# Patient Record
Sex: FEMALE | Race: OTHER
[De-identification: names, ages, dates, MRNs, and addresses within clinical notes are randomized per-mention and may not be internally consistent; named-entity substitution may affect disease eponyms.]

---

## 2019-10-16 ENCOUNTER — HOSPITAL ENCOUNTER (OUTPATIENT)
Dept: HOSPITAL 53 - M SFHCLERA | Age: 29
End: 2019-10-16
Attending: NURSE PRACTITIONER
Payer: COMMERCIAL

## 2019-10-16 ENCOUNTER — HOSPITAL ENCOUNTER (OUTPATIENT)
Dept: HOSPITAL 53 - M LRY | Age: 29
End: 2019-10-16
Attending: NURSE PRACTITIONER
Payer: COMMERCIAL

## 2019-10-16 DIAGNOSIS — N89.8: Primary | ICD-10-CM

## 2019-10-16 DIAGNOSIS — R06.09: Primary | ICD-10-CM

## 2019-10-16 LAB
CHLAMYDIA DNA AMPLIFICATION: NEGATIVE
N GONORRHOEA RRNA SPEC QL NAA+PROBE: NEGATIVE

## 2019-11-01 ENCOUNTER — HOSPITAL ENCOUNTER (OUTPATIENT)
Dept: HOSPITAL 53 - M SFHCLERA | Age: 29
End: 2019-11-01
Attending: PHYSICIAN ASSISTANT
Payer: COMMERCIAL

## 2019-11-01 DIAGNOSIS — N89.8: Primary | ICD-10-CM

## 2019-11-01 LAB
CHLAMYDIA DNA AMPLIFICATION: NEGATIVE
N GONORRHOEA RRNA SPEC QL NAA+PROBE: NEGATIVE

## 2020-03-05 ENCOUNTER — HOSPITAL ENCOUNTER (OUTPATIENT)
Dept: HOSPITAL 53 - M SFHCWAGY | Age: 30
End: 2020-03-05
Attending: ADVANCED PRACTICE MIDWIFE
Payer: COMMERCIAL

## 2020-03-05 DIAGNOSIS — Z11.3: Primary | ICD-10-CM

## 2020-03-05 DIAGNOSIS — Z12.4: ICD-10-CM

## 2020-03-05 LAB
CHLAMYDIA DNA AMPLIFICATION: NEGATIVE
N GONORRHOEA RRNA SPEC QL NAA+PROBE: NEGATIVE

## 2021-03-02 ENCOUNTER — HOSPITAL ENCOUNTER (OUTPATIENT)
Dept: HOSPITAL 53 - M SDC | Age: 31
LOS: 1 days | Discharge: HOME | End: 2021-03-03
Attending: PLASTIC SURGERY
Payer: COMMERCIAL

## 2021-03-02 VITALS — DIASTOLIC BLOOD PRESSURE: 70 MMHG | SYSTOLIC BLOOD PRESSURE: 111 MMHG

## 2021-03-02 VITALS — DIASTOLIC BLOOD PRESSURE: 67 MMHG | SYSTOLIC BLOOD PRESSURE: 114 MMHG

## 2021-03-02 VITALS — DIASTOLIC BLOOD PRESSURE: 68 MMHG | SYSTOLIC BLOOD PRESSURE: 117 MMHG

## 2021-03-02 VITALS — DIASTOLIC BLOOD PRESSURE: 72 MMHG | SYSTOLIC BLOOD PRESSURE: 124 MMHG

## 2021-03-02 VITALS — SYSTOLIC BLOOD PRESSURE: 120 MMHG | DIASTOLIC BLOOD PRESSURE: 62 MMHG

## 2021-03-02 VITALS — HEIGHT: 66 IN | WEIGHT: 210 LBS | BODY MASS INDEX: 33.75 KG/M2

## 2021-03-02 VITALS — SYSTOLIC BLOOD PRESSURE: 111 MMHG | DIASTOLIC BLOOD PRESSURE: 64 MMHG

## 2021-03-02 VITALS — SYSTOLIC BLOOD PRESSURE: 118 MMHG | DIASTOLIC BLOOD PRESSURE: 67 MMHG

## 2021-03-02 VITALS — DIASTOLIC BLOOD PRESSURE: 67 MMHG | SYSTOLIC BLOOD PRESSURE: 125 MMHG

## 2021-03-02 DIAGNOSIS — J45.909: ICD-10-CM

## 2021-03-02 DIAGNOSIS — N62: Primary | ICD-10-CM

## 2021-03-02 PROCEDURE — 96361 HYDRATE IV INFUSION ADD-ON: CPT

## 2021-03-02 PROCEDURE — 96365 THER/PROPH/DIAG IV INF INIT: CPT

## 2021-03-02 PROCEDURE — 88300 SURGICAL PATH GROSS: CPT

## 2021-03-02 PROCEDURE — 96366 THER/PROPH/DIAG IV INF ADDON: CPT

## 2021-03-02 PROCEDURE — 88305 TISSUE EXAM BY PATHOLOGIST: CPT

## 2021-03-02 PROCEDURE — 19318 BREAST REDUCTION: CPT

## 2021-03-02 RX ADMIN — CEFAZOLIN SODIUM SCH MLS/HR: 1 INJECTION, POWDER, FOR SOLUTION INTRAMUSCULAR; INTRAVENOUS at 15:08

## 2021-03-02 RX ADMIN — SODIUM CHLORIDE, POTASSIUM CHLORIDE, SODIUM LACTATE AND CALCIUM CHLORIDE SCH MLS/HR: 600; 310; 30; 20 INJECTION, SOLUTION INTRAVENOUS at 11:36

## 2021-03-02 RX ADMIN — CEFAZOLIN SODIUM SCH MLS/HR: 1 INJECTION, POWDER, FOR SOLUTION INTRAMUSCULAR; INTRAVENOUS at 22:26

## 2021-03-02 NOTE — ROOPDOC
[facility] Report Of Operation


Report of Operation


DATE OF PROCEDURE: 3/2/21





PREOPERATIVE DIAGNOSIS: Bilateral breast hypertrophy





POSTOPERATIVE DIAGNOSIS: same





FINDINGS: Large breasts 





PROCEDURE: Bilateral breast reduction





SURGEON: Dr Thomas





ANESTHESIA: General





SPECIMENS: Right breast 709 gm, Left breast 723 gm





ESTIMATED BLOOD LOSS: 100 cc 





REPLACED: none





DRAINS: 10 mm SERA x 2





COMPLICATIONS: none





POSTOPERATIVE CONDITION: stable








DESCRIPTION OF PROCEDURE: This is a 30-year-old female who upper back and neck 

pain worsened by large breasts. She wears 40 DD bra. She is scheduled for bilat

eral breast reduction. Risks, benefits, and alternatives were discussed with the

patient in detail, and she is ready to proceed. 


The day of surgery, she was marked in the upright position and informed consent 

was obtained. She measured 32 cm from sternal notch to nipple on both sides, IMF

at 21 cm bilaterally. She was brought into the operating room and placed in the 

supine position. Preoperative antibiotics and 5000 units heparin subcutaneous 

were given. Sequential pneumatic stocking were placed on the lower calves. 

General anesthesia was induced. She was prepped and draped in the usual sterile 

fashion. 





We started our procedure on the right side. Her nipple areolar complex was 

outlined 45 mm in diameter, and the patient was marked according superior medial

pedicle. We started our incision by scoring the nipple areolar complex area, and

then dissection was continued until the inferior lateral portion of the breast 

was resected. Hemostasis was obtained using electrocautery. The pedicle was 

de-epithelialized using Vargas scissors, good perfusion to the nipple at all 

times. Wound was irrigated with Bacitracin solution. We used Exparel 6 cc for 

local anesthesia to infiltrate in the Pectoralis muscle as well as the breast 

tissue.  Than pedicle was turned superior to its new location at 21 cm from 

sternal notch. The mound was re-created using conforming 0 Vicryl sutures.  

Pillars were closed with interrupted 3-0 Monocryl sutures. The vertical limb was

8 cm. Excess tissue inferiorly was measured and resected, creating the 

horizontal scar. Nipple area complex was brought into view through the new 

opening and sutured in place with 3-0 and 4-0 Monocryl sutures and a 5-0 plain. 

A 10 mm Lincoln-Singh drain was placed through the lateral portion of the 

horizontal incision. 150 mL of tumescent solution infiltrated in the lateral 

chest. Vaser suction assisted lipectomy performed for 2 minutes, followed by 

regular liposuction. 150 mL of suction fluid was evacuated.





Then we turned our attention to the left side. Mirror procedure was carried out.

Again, resection was done according to superior-medial pedicle using 

electrocautery and PEEK cautery. Hemostasis was obtained. The pedicle was in 

good viable condition. Exparel was infiltrated through the pectoralis muscle and

the breast tissue 6 cc.  Than pedicle was turned superior to its new location at

21 cm from sternal notch. The mound was re-created using conforming 0 Vicryl 

sutures.  Pillars were closed with interrupted 3-0 Monocryl sutures. The 

vertical limb was 8 cm. Excess tissue inferiorly was measured and resected, 

creating the horizontal scar. Nipple area complex was brought into view through 

the new opening and sutured in place with 3-0 and 4-0 Monocryl sutures and a 5-0

plain gut suture in interrupted fashion. A 10 mm Lincoln-Singh drain was placed 

through the lateral portion of the horizontal incision. 150 mL of tumescent 

solution infiltrated in the lateral chest. Vaser suction assisted lipectomy 

performed for 2 minutes, followed by regular liposuction. 150 mL of suction f

luid was evacuated. Good symmetry was achieved between right and left sides, 

lateral breast and chest area is symmetrical now.


Remaining Exparel injected in the horizontal incision. Total Exparel use 20 cc. 


Resected tissue sent to pathology in two specimens right and left breast tissue.

Right breast 709 grams, left breast 723 grams.


Dressings were applied to vertical and horizontal incision: Prineo. Nipples 

areolar complex:  Xeroform and a bulky dressing with a surgical bra. 


Patient was extubated in the operating room without difficulty and was 

transferred to the recovery room in stable condition.














TANYA THOMAS DO               Mar 2, 2021 11:36

## 2021-03-02 NOTE — POST-OPPD
Postoperative Procedure Note


Date Of Procedure:  Mar 2, 2021





PREOPERATIVE DIAGNOSIS: Bilateral breast hypertrophy





POSTOPERATIVE DIAGNOSIS: same





FINDINGS: Large breasts 





PROCEDURE: Bilateral breast reduction





SURGEON: Dr Thomas





ANESTHESIA: General





SPECIMENS: Right breast 709 gm, Left breast 723 gm





ESTIMATED BLOOD LOSS: 100 cc 





REPLACED: none





DRAINS: 10 mm SERA x 2





COMPLICATIONS: none





POSTOPERATIVE CONDITION: stable











TANYA THOMAS DO               Mar 2, 2021 11:35

## 2021-03-03 VITALS — SYSTOLIC BLOOD PRESSURE: 116 MMHG | DIASTOLIC BLOOD PRESSURE: 72 MMHG

## 2021-03-03 VITALS — DIASTOLIC BLOOD PRESSURE: 58 MMHG | SYSTOLIC BLOOD PRESSURE: 106 MMHG

## 2021-03-03 VITALS — DIASTOLIC BLOOD PRESSURE: 74 MMHG | SYSTOLIC BLOOD PRESSURE: 131 MMHG

## 2021-03-03 RX ADMIN — CEFAZOLIN SODIUM SCH MLS/HR: 1 INJECTION, POWDER, FOR SOLUTION INTRAMUSCULAR; INTRAVENOUS at 05:48

## 2021-03-03 RX ADMIN — SODIUM CHLORIDE, POTASSIUM CHLORIDE, SODIUM LACTATE AND CALCIUM CHLORIDE SCH MLS/HR: 600; 310; 30; 20 INJECTION, SOLUTION INTRAVENOUS at 05:47

## 2021-03-03 NOTE — IPNPDOC
Subjective


General


Date Seen:  Mar 3, 2021





Subject


Chief Complaint/History


The patient is a 30-year-old female admitted with a reason for visit of 

Bilateral Breast Hypertrophy. 


S/p lateral breast reduction postop day 1. Doing well. Ambulating, tolerating 

regular diet.





Current Medications


Current Medications





Current Medications








 Medications


  (Trade)  Dose


 Ordered  Sig/Triston


 Route


 PRN Reason  Start Time


 Stop Time Status Last Admin


Dose Admin


 


 Acetaminophen


  (Tylenol Tab)  650 mg  Q6H  PRN


 PO


 MILD PAIN (PS 1-4)  3/2/21 11:40


     





 


 Cefazolin Sodium


 1 gm/Dextrose  50 ml @ 


 100 mls/hr  Q8H


 IV


   3/2/21 15:00


    3/3/21 05:48





 


 Fentanyl Citrate


  (Sublimaze)  25 mcg  Q5MP  PRN


 IV


 PAIN LEVEL 5-10  3/2/21 12:00


 3/2/21 13:00 DC  





 


 Ketorolac


 Tromethamine


  (ToRADol)  10 mg  Q6HP  PRN


 PO


 MODERATE PAIN (PS 5-7)  3/2/21 11:40


 3/7/21 11:39  3/2/21 20:42





 


 Lactated Ringer's  1,000 ml @ 


 75 mls/hr  [id number]


 IV


   3/2/21 11:36


    3/3/21 05:47





 


 Lactated Ringer's  1,000 ml @ 


 80 mls/hr  [id number]


 IV


   3/2/21 12:00


 3/2/21 13:00 DC  





 


 Ondansetron HCl


  (ZOFRAN


 INJection)  4 mg  Q4H  PRN


 IV


 NAUSEA OR VOMITING  3/2/21 11:40


     





 


 Ondansetron HCl


  (ZOFRAN


 INJection)  4 mg  Q4HP  PRN


 IV


 NAUSEA OR VOMITING  3/2/21 12:00


 3/2/21 13:00 DC  





 


 Oxycodone HCl


  (Roxicodone,


 Oxyir)  5 mg  ASDIRECTED  PRN


 PO


 PAIN LEVEL 1-4  3/2/21 12:00


 3/2/21 13:00 DC  





 


 Oxycodone/


 Acetaminophen


  (Percocet 5mg/


 325mg Tablet)  2 tab  Q4HP  PRN


 PO


 PAIN LEVEL 8-10  3/2/21 11:40


    3/2/21 22:26














Allergies


Coded Allergies:  


     No Known Allergies (Unverified , 2/25/21)





Objective


Physical Examination


Examination


GENERAL APPEARANCE:Patient seen, laying in bed, awake, alert, and oriented. 

Comfortable, in no acute distress.


SKIN: Warm and moist.


BREAST: Right and left soft, non-tender incisions intact. SERA drains: 58/35 cc/24

hr. NAC: Viable, warm, symmetrical, mild post-op ecchymosis, no expanding 

hematoma.


LUNGS: Clear to auscultation bilaterally. No wheezing appreciated.


HEART: No chest wall abnormalities. Regular rate and rhythm with no murmurs 

appreciated.


ABDOMEN: Abdomen is soft, non-tender, non-distended. I


EXTREMITIES: No edema identified. No calf tenderness.


Vital Signs





Vital Signs








  Date Time  Temp Pulse Resp B/P (MAP) Pulse Ox O2 Delivery O2 Flow Rate FiO2


 


3/3/21 10:00 98.1 78 18 131/74 (93) 99 Room Air  








I&Os











I&O- Last 24 Hours up to 6 AM 


 


 3/3/21





 06:00


 


Intake Total 4105 ml


 


Output Total 218 ml


 


Balance 3887 ml











Impression


S/p bilateral breast reduction


Doing well


Stable for discharge


Keep drains, monitor daily output.


Bra support.


Pain control, prescription for Percocet sent to the pharmacy.


F/up plastic surgery.





Plan / VTE


VTE Prophylaxis Ordered?:  Yes











TANYA THOMAS DO               Mar 3, 2021 12:12